# Patient Record
Sex: FEMALE | Race: WHITE | NOT HISPANIC OR LATINO | URBAN - METROPOLITAN AREA
[De-identification: names, ages, dates, MRNs, and addresses within clinical notes are randomized per-mention and may not be internally consistent; named-entity substitution may affect disease eponyms.]

---

## 2017-08-10 ENCOUNTER — HOSPITAL ENCOUNTER (OUTPATIENT)
Dept: PHYSICAL THERAPY | Facility: HOSPITAL | Age: 47
Setting detail: RECURRING SERIES
Discharge: HOME | End: 2017-11-30
Attending: ORTHOPAEDIC SURGERY | Admitting: ORTHOPAEDIC SURGERY

## 2017-12-20 ENCOUNTER — APPOINTMENT (OUTPATIENT)
Dept: WOMENS IMAGING | Facility: HOSPITAL | Age: 47
End: 2017-12-20

## 2017-12-20 PROCEDURE — 77067 SCR MAMMO BI INCL CAD: CPT | Performed by: RADIOLOGY

## 2017-12-20 PROCEDURE — 77063 BREAST TOMOSYNTHESIS BI: CPT | Performed by: RADIOLOGY

## 2025-01-12 ENCOUNTER — APPOINTMENT (OUTPATIENT)
Dept: GENERAL RADIOLOGY | Facility: HOSPITAL | Age: 55
End: 2025-01-12
Payer: COMMERCIAL

## 2025-01-12 ENCOUNTER — HOSPITAL ENCOUNTER (OUTPATIENT)
Facility: HOSPITAL | Age: 55
Setting detail: OBSERVATION
Discharge: HOME | End: 2025-01-13
Attending: EMERGENCY MEDICINE | Admitting: EMERGENCY MEDICINE
Payer: COMMERCIAL

## 2025-01-12 DIAGNOSIS — R07.9 CHEST PAIN, UNSPECIFIED TYPE: Primary | ICD-10-CM

## 2025-01-12 DIAGNOSIS — R11.0 NAUSEA: ICD-10-CM

## 2025-01-12 DIAGNOSIS — R42 DIZZINESS AND GIDDINESS: ICD-10-CM

## 2025-01-12 DIAGNOSIS — R42 DIZZINESS: ICD-10-CM

## 2025-01-12 DIAGNOSIS — E83.42 HYPOMAGNESEMIA: ICD-10-CM

## 2025-01-12 LAB
ALBUMIN SERPL-MCNC: 3.8 G/DL (ref 3.5–5.2)
ALBUMIN/GLOB SERPL: 1.1 G/DL
ALP SERPL-CCNC: 95 U/L (ref 39–117)
ALT SERPL W P-5'-P-CCNC: 31 U/L (ref 1–33)
ANION GAP SERPL CALCULATED.3IONS-SCNC: 10.2 MMOL/L (ref 5–15)
AST SERPL-CCNC: 31 U/L (ref 1–32)
BASOPHILS # BLD AUTO: 0.04 10*3/MM3 (ref 0–0.2)
BASOPHILS NFR BLD AUTO: 0.6 % (ref 0–1.5)
BILIRUB SERPL-MCNC: 0.5 MG/DL (ref 0–1.2)
BUN SERPL-MCNC: 11 MG/DL (ref 6–20)
BUN/CREAT SERPL: 14.7 (ref 7–25)
CALCIUM SPEC-SCNC: 8.8 MG/DL (ref 8.6–10.5)
CHLORIDE SERPL-SCNC: 100 MMOL/L (ref 98–107)
CO2 SERPL-SCNC: 25.8 MMOL/L (ref 22–29)
CREAT SERPL-MCNC: 0.75 MG/DL (ref 0.57–1)
DEPRECATED RDW RBC AUTO: 39.2 FL (ref 37–54)
EGFRCR SERPLBLD CKD-EPI 2021: 94.7 ML/MIN/1.73
EOSINOPHIL # BLD AUTO: 0.22 10*3/MM3 (ref 0–0.4)
EOSINOPHIL NFR BLD AUTO: 3.5 % (ref 0.3–6.2)
ERYTHROCYTE [DISTWIDTH] IN BLOOD BY AUTOMATED COUNT: 12.9 % (ref 12.3–15.4)
GEN 5 1HR TROPONIN T REFLEX: 10 NG/L
GLOBULIN UR ELPH-MCNC: 3.5 GM/DL
GLUCOSE SERPL-MCNC: 199 MG/DL (ref 65–99)
HCT VFR BLD AUTO: 42.2 % (ref 34–46.6)
HGB BLD-MCNC: 14.8 G/DL (ref 12–15.9)
IMM GRANULOCYTES # BLD AUTO: 0.02 10*3/MM3 (ref 0–0.05)
IMM GRANULOCYTES NFR BLD AUTO: 0.3 % (ref 0–0.5)
LYMPHOCYTES # BLD AUTO: 1.87 10*3/MM3 (ref 0.7–3.1)
LYMPHOCYTES NFR BLD AUTO: 30 % (ref 19.6–45.3)
MAGNESIUM SERPL-MCNC: 1.3 MG/DL (ref 1.6–2.6)
MCH RBC QN AUTO: 29.4 PG (ref 26.6–33)
MCHC RBC AUTO-ENTMCNC: 35.1 G/DL (ref 31.5–35.7)
MCV RBC AUTO: 83.7 FL (ref 79–97)
MONOCYTES # BLD AUTO: 0.47 10*3/MM3 (ref 0.1–0.9)
MONOCYTES NFR BLD AUTO: 7.5 % (ref 5–12)
NEUTROPHILS NFR BLD AUTO: 3.62 10*3/MM3 (ref 1.7–7)
NEUTROPHILS NFR BLD AUTO: 58.1 % (ref 42.7–76)
NRBC BLD AUTO-RTO: 0 /100 WBC (ref 0–0.2)
PLATELET # BLD AUTO: 172 10*3/MM3 (ref 140–450)
PMV BLD AUTO: 11 FL (ref 6–12)
POTASSIUM SERPL-SCNC: 4 MMOL/L (ref 3.5–5.2)
PROT SERPL-MCNC: 7.3 G/DL (ref 6–8.5)
RBC # BLD AUTO: 5.04 10*6/MM3 (ref 3.77–5.28)
SODIUM SERPL-SCNC: 136 MMOL/L (ref 136–145)
TROPONIN T NUMERIC DELTA: 0 NG/L
TROPONIN T SERPL HS-MCNC: 10 NG/L
TSH SERPL DL<=0.05 MIU/L-ACNC: 2.14 UIU/ML (ref 0.27–4.2)
WBC NRBC COR # BLD AUTO: 6.24 10*3/MM3 (ref 3.4–10.8)

## 2025-01-12 PROCEDURE — 25010000002 MAGNESIUM SULFATE 2 GM/50ML SOLUTION: Performed by: EMERGENCY MEDICINE

## 2025-01-12 PROCEDURE — 85025 COMPLETE CBC W/AUTO DIFF WBC: CPT

## 2025-01-12 PROCEDURE — 83735 ASSAY OF MAGNESIUM: CPT

## 2025-01-12 PROCEDURE — G0378 HOSPITAL OBSERVATION PER HR: HCPCS

## 2025-01-12 PROCEDURE — 80053 COMPREHEN METABOLIC PANEL: CPT

## 2025-01-12 PROCEDURE — 63710000001 ONDANSETRON ODT 4 MG TABLET DISPERSIBLE

## 2025-01-12 PROCEDURE — 84443 ASSAY THYROID STIM HORMONE: CPT

## 2025-01-12 PROCEDURE — 96365 THER/PROPH/DIAG IV INF INIT: CPT

## 2025-01-12 PROCEDURE — 93005 ELECTROCARDIOGRAM TRACING: CPT | Performed by: EMERGENCY MEDICINE

## 2025-01-12 PROCEDURE — 84484 ASSAY OF TROPONIN QUANT: CPT

## 2025-01-12 PROCEDURE — 71045 X-RAY EXAM CHEST 1 VIEW: CPT

## 2025-01-12 PROCEDURE — 36415 COLL VENOUS BLD VENIPUNCTURE: CPT

## 2025-01-12 PROCEDURE — 96366 THER/PROPH/DIAG IV INF ADDON: CPT

## 2025-01-12 PROCEDURE — 93005 ELECTROCARDIOGRAM TRACING: CPT

## 2025-01-12 PROCEDURE — 99285 EMERGENCY DEPT VISIT HI MDM: CPT

## 2025-01-12 RX ORDER — SODIUM CHLORIDE 0.9 % (FLUSH) 0.9 %
10 SYRINGE (ML) INJECTION AS NEEDED
Status: DISCONTINUED | OUTPATIENT
Start: 2025-01-12 | End: 2025-01-13 | Stop reason: HOSPADM

## 2025-01-12 RX ORDER — ACETAMINOPHEN 160 MG/5ML
650 SOLUTION ORAL EVERY 4 HOURS PRN
Status: DISCONTINUED | OUTPATIENT
Start: 2025-01-12 | End: 2025-01-13 | Stop reason: HOSPADM

## 2025-01-12 RX ORDER — MECLIZINE HYDROCHLORIDE 25 MG/1
25 TABLET ORAL 2 TIMES DAILY PRN
Status: DISCONTINUED | OUTPATIENT
Start: 2025-01-12 | End: 2025-01-13 | Stop reason: HOSPADM

## 2025-01-12 RX ORDER — CETIRIZINE HYDROCHLORIDE 10 MG/1
10 TABLET ORAL DAILY
COMMUNITY

## 2025-01-12 RX ORDER — BISACODYL 5 MG/1
5 TABLET, DELAYED RELEASE ORAL DAILY PRN
Status: DISCONTINUED | OUTPATIENT
Start: 2025-01-12 | End: 2025-01-13 | Stop reason: HOSPADM

## 2025-01-12 RX ORDER — ONDANSETRON 4 MG/1
4 TABLET, ORALLY DISINTEGRATING ORAL ONCE
Status: COMPLETED | OUTPATIENT
Start: 2025-01-12 | End: 2025-01-12

## 2025-01-12 RX ORDER — ASPIRIN 81 MG/1
324 TABLET, CHEWABLE ORAL ONCE
Status: COMPLETED | OUTPATIENT
Start: 2025-01-12 | End: 2025-01-12

## 2025-01-12 RX ORDER — AMOXICILLIN 250 MG
2 CAPSULE ORAL 2 TIMES DAILY PRN
Status: DISCONTINUED | OUTPATIENT
Start: 2025-01-12 | End: 2025-01-13 | Stop reason: HOSPADM

## 2025-01-12 RX ORDER — ONDANSETRON 2 MG/ML
4 INJECTION INTRAMUSCULAR; INTRAVENOUS EVERY 6 HOURS PRN
Status: DISCONTINUED | OUTPATIENT
Start: 2025-01-12 | End: 2025-01-13 | Stop reason: HOSPADM

## 2025-01-12 RX ORDER — ONDANSETRON 4 MG/1
4 TABLET, ORALLY DISINTEGRATING ORAL EVERY 6 HOURS PRN
Status: DISCONTINUED | OUTPATIENT
Start: 2025-01-12 | End: 2025-01-13 | Stop reason: HOSPADM

## 2025-01-12 RX ORDER — ACETAMINOPHEN 650 MG/1
650 SUPPOSITORY RECTAL EVERY 4 HOURS PRN
Status: DISCONTINUED | OUTPATIENT
Start: 2025-01-12 | End: 2025-01-13 | Stop reason: HOSPADM

## 2025-01-12 RX ORDER — IBUPROFEN 800 MG/1
800 TABLET, FILM COATED ORAL EVERY 8 HOURS PRN
COMMUNITY

## 2025-01-12 RX ORDER — ENOXAPARIN SODIUM 100 MG/ML
40 INJECTION SUBCUTANEOUS DAILY
Status: DISCONTINUED | OUTPATIENT
Start: 2025-01-13 | End: 2025-01-13 | Stop reason: HOSPADM

## 2025-01-12 RX ORDER — POLYETHYLENE GLYCOL 3350 17 G/17G
17 POWDER, FOR SOLUTION ORAL DAILY PRN
Status: DISCONTINUED | OUTPATIENT
Start: 2025-01-12 | End: 2025-01-13 | Stop reason: HOSPADM

## 2025-01-12 RX ORDER — SODIUM CHLORIDE 9 MG/ML
40 INJECTION, SOLUTION INTRAVENOUS AS NEEDED
Status: DISCONTINUED | OUTPATIENT
Start: 2025-01-12 | End: 2025-01-13 | Stop reason: HOSPADM

## 2025-01-12 RX ORDER — BISACODYL 10 MG
10 SUPPOSITORY, RECTAL RECTAL DAILY PRN
Status: DISCONTINUED | OUTPATIENT
Start: 2025-01-12 | End: 2025-01-13 | Stop reason: HOSPADM

## 2025-01-12 RX ORDER — NITROGLYCERIN 0.4 MG/1
0.4 TABLET SUBLINGUAL
Status: DISCONTINUED | OUTPATIENT
Start: 2025-01-12 | End: 2025-01-13 | Stop reason: HOSPADM

## 2025-01-12 RX ORDER — SODIUM CHLORIDE 0.9 % (FLUSH) 0.9 %
10 SYRINGE (ML) INJECTION EVERY 12 HOURS SCHEDULED
Status: DISCONTINUED | OUTPATIENT
Start: 2025-01-12 | End: 2025-01-13 | Stop reason: HOSPADM

## 2025-01-12 RX ORDER — ACETAMINOPHEN 325 MG/1
650 TABLET ORAL EVERY 4 HOURS PRN
Status: DISCONTINUED | OUTPATIENT
Start: 2025-01-12 | End: 2025-01-13 | Stop reason: HOSPADM

## 2025-01-12 RX ORDER — MECLIZINE HYDROCHLORIDE 25 MG/1
25 TABLET ORAL ONCE
Status: COMPLETED | OUTPATIENT
Start: 2025-01-12 | End: 2025-01-12

## 2025-01-12 RX ORDER — TRAMADOL HYDROCHLORIDE 50 MG/1
50 TABLET ORAL 3 TIMES DAILY PRN
COMMUNITY

## 2025-01-12 RX ORDER — MAGNESIUM SULFATE HEPTAHYDRATE 40 MG/ML
2 INJECTION, SOLUTION INTRAVENOUS
Status: COMPLETED | OUTPATIENT
Start: 2025-01-12 | End: 2025-01-13

## 2025-01-12 RX ADMIN — ASPIRIN 81 MG CHEWABLE TABLET 324 MG: 81 TABLET CHEWABLE at 14:47

## 2025-01-12 RX ADMIN — ONDANSETRON 4 MG: 4 TABLET, ORALLY DISINTEGRATING ORAL at 18:35

## 2025-01-12 RX ADMIN — MAGNESIUM SULFATE IN WATER FOR 2 G: 40 INJECTION INTRAVENOUS at 16:36

## 2025-01-12 RX ADMIN — MAGNESIUM SULFATE IN WATER FOR 2 G: 40 INJECTION INTRAVENOUS at 19:49

## 2025-01-12 RX ADMIN — Medication 10 ML: at 22:02

## 2025-01-12 RX ADMIN — MAGNESIUM SULFATE IN WATER FOR 2 G: 40 INJECTION INTRAVENOUS at 22:20

## 2025-01-12 RX ADMIN — ONDANSETRON 4 MG: 4 TABLET, ORALLY DISINTEGRATING ORAL at 14:47

## 2025-01-12 RX ADMIN — MECLIZINE HYDROCHLORIDE 25 MG: 25 TABLET ORAL at 18:35

## 2025-01-12 NOTE — ED NOTES
Provider Krystle at bedside to speak with patient regarding labs/diagnostic and plan of care, pending discharge. Family at bedside. No distress. Vitals stable. No needs.Sinus rhythm on monitor. Pt. Denies chest pain.

## 2025-01-12 NOTE — ED NOTES
Pt. Return to bed after discharge stating while leaving she went to the restroom and felt weak/dizzy and near syncopal. C/o nausea and dizziness at this time. Fully dressed with family at bedside. Provider notified and pt. Given the option to stay overnight for observation.

## 2025-01-12 NOTE — DISCHARGE INSTRUCTIONS
Increase fluid intake.  Continue home medications.    Follow-up closely with PCP for repeat labs.    Follow-up with cardiology.    Return to the ED for any new or worsening symptoms.

## 2025-01-13 ENCOUNTER — APPOINTMENT (OUTPATIENT)
Dept: NUCLEAR MEDICINE | Facility: HOSPITAL | Age: 55
End: 2025-01-13
Payer: COMMERCIAL

## 2025-01-13 ENCOUNTER — APPOINTMENT (OUTPATIENT)
Dept: CARDIOLOGY | Facility: HOSPITAL | Age: 55
End: 2025-01-13
Payer: COMMERCIAL

## 2025-01-13 ENCOUNTER — APPOINTMENT (OUTPATIENT)
Dept: RESPIRATORY THERAPY | Facility: HOSPITAL | Age: 55
End: 2025-01-13
Payer: COMMERCIAL

## 2025-01-13 VITALS
SYSTOLIC BLOOD PRESSURE: 152 MMHG | BODY MASS INDEX: 36.88 KG/M2 | WEIGHT: 235 LBS | RESPIRATION RATE: 15 BRPM | HEIGHT: 67 IN | HEART RATE: 80 BPM | DIASTOLIC BLOOD PRESSURE: 79 MMHG | TEMPERATURE: 98.4 F | OXYGEN SATURATION: 96 %

## 2025-01-13 LAB
ANION GAP SERPL CALCULATED.3IONS-SCNC: 11.2 MMOL/L (ref 5–15)
AV MEAN PRESS GRAD SYS DOP V1V2: 6 MMHG
AV VMAX SYS DOP: 156 CM/SEC
BH CV ECHO MEAS - AO MAX PG: 9.7 MMHG
BH CV ECHO MEAS - AO V2 VTI: 30.3 CM
BH CV ECHO MEAS - AVA(I,D): 2.6 CM2
BH CV ECHO MEAS - EDV(CUBED): 74.1 ML
BH CV ECHO MEAS - EDV(MOD-SP4): 125 ML
BH CV ECHO MEAS - EF(MOD-SP4): 67.6 %
BH CV ECHO MEAS - ESV(CUBED): 24.4 ML
BH CV ECHO MEAS - ESV(MOD-SP4): 40.5 ML
BH CV ECHO MEAS - FS: 31 %
BH CV ECHO MEAS - IVS/LVPW: 1 CM
BH CV ECHO MEAS - IVSD: 1.2 CM
BH CV ECHO MEAS - LA DIMENSION: 3.6 CM
BH CV ECHO MEAS - LAT PEAK E' VEL: 8.1 CM/SEC
BH CV ECHO MEAS - LV DIASTOLIC VOL/BSA (35-75): 57.7 CM2
BH CV ECHO MEAS - LV MASS(C)D: 178.2 GRAMS
BH CV ECHO MEAS - LV MAX PG: 6.3 MMHG
BH CV ECHO MEAS - LV MEAN PG: 3 MMHG
BH CV ECHO MEAS - LV SYSTOLIC VOL/BSA (12-30): 18.7 CM2
BH CV ECHO MEAS - LV V1 MAX: 125 CM/SEC
BH CV ECHO MEAS - LV V1 VTI: 25.1 CM
BH CV ECHO MEAS - LVIDD: 4.2 CM
BH CV ECHO MEAS - LVIDS: 2.9 CM
BH CV ECHO MEAS - LVOT AREA: 3.1 CM2
BH CV ECHO MEAS - LVOT DIAM: 2 CM
BH CV ECHO MEAS - LVPWD: 1.2 CM
BH CV ECHO MEAS - MED PEAK E' VEL: 5.4 CM/SEC
BH CV ECHO MEAS - MV A MAX VEL: 106 CM/SEC
BH CV ECHO MEAS - MV DEC SLOPE: 395 CM/SEC2
BH CV ECHO MEAS - MV DEC TIME: 0.22 SEC
BH CV ECHO MEAS - MV E MAX VEL: 79.1 CM/SEC
BH CV ECHO MEAS - MV E/A: 0.75
BH CV ECHO MEAS - MV MAX PG: 5.6 MMHG
BH CV ECHO MEAS - MV MEAN PG: 3 MMHG
BH CV ECHO MEAS - MV P1/2T: 72.4 MSEC
BH CV ECHO MEAS - MV V2 VTI: 27.7 CM
BH CV ECHO MEAS - MVA(P1/2T): 3 CM2
BH CV ECHO MEAS - MVA(VTI): 2.8 CM2
BH CV ECHO MEAS - PA ACC TIME: 0.12 SEC
BH CV ECHO MEAS - PA V2 MAX: 107 CM/SEC
BH CV ECHO MEAS - RAP SYSTOLE: 3 MMHG
BH CV ECHO MEAS - RV MAX PG: 2.45 MMHG
BH CV ECHO MEAS - RV V1 MAX: 78.2 CM/SEC
BH CV ECHO MEAS - RV V1 VTI: 16.7 CM
BH CV ECHO MEAS - RVDD: 3 CM
BH CV ECHO MEAS - RVSP: 10.8 MMHG
BH CV ECHO MEAS - SV(LVOT): 78.9 ML
BH CV ECHO MEAS - SV(MOD-SP4): 84.5 ML
BH CV ECHO MEAS - SVI(LVOT): 36.4 ML/M2
BH CV ECHO MEAS - SVI(MOD-SP4): 39 ML/M2
BH CV ECHO MEAS - TAPSE (>1.6): 2.14 CM
BH CV ECHO MEAS - TR MAX PG: 7.8 MMHG
BH CV ECHO MEAS - TR MAX VEL: 140 CM/SEC
BH CV ECHO MEASUREMENTS AVERAGE E/E' RATIO: 11.72
BH CV REST NUCLEAR ISOTOPE DOSE: 11 MCI
BH CV STRESS BP STAGE 1: NORMAL
BH CV STRESS BP STAGE 2: NORMAL
BH CV STRESS COMMENTS STAGE 1: NORMAL
BH CV STRESS COMMENTS STAGE 2: NORMAL
BH CV STRESS DOSE REGADENOSON STAGE 1: 0.4
BH CV STRESS DURATION MIN STAGE 1: 0
BH CV STRESS DURATION MIN STAGE 2: 4
BH CV STRESS DURATION SEC STAGE 1: 10
BH CV STRESS DURATION SEC STAGE 2: 0
BH CV STRESS HR STAGE 1: 96
BH CV STRESS HR STAGE 2: 92
BH CV STRESS NUCLEAR ISOTOPE DOSE: 33 MCI
BH CV STRESS PROTOCOL 1: NORMAL
BH CV STRESS RECOVERY BP: NORMAL MMHG
BH CV STRESS RECOVERY HR: 92 BPM
BH CV STRESS STAGE 1: 1
BH CV STRESS STAGE 2: 2
BH CV XLRA - TDI S': 12.3 CM/SEC
BUN SERPL-MCNC: 9 MG/DL (ref 6–20)
BUN/CREAT SERPL: 13.8 (ref 7–25)
CALCIUM SPEC-SCNC: 8.8 MG/DL (ref 8.6–10.5)
CHLORIDE SERPL-SCNC: 101 MMOL/L (ref 98–107)
CHOLEST SERPL-MCNC: 131 MG/DL (ref 0–200)
CO2 SERPL-SCNC: 24.8 MMOL/L (ref 22–29)
CREAT SERPL-MCNC: 0.65 MG/DL (ref 0.57–1)
DEPRECATED RDW RBC AUTO: 39.3 FL (ref 37–54)
EGFRCR SERPLBLD CKD-EPI 2021: 104.8 ML/MIN/1.73
EOSINOPHIL # BLD MANUAL: 0.17 10*3/MM3 (ref 0–0.4)
EOSINOPHIL NFR BLD MANUAL: 2 % (ref 0.3–6.2)
ERYTHROCYTE [DISTWIDTH] IN BLOOD BY AUTOMATED COUNT: 12.9 % (ref 12.3–15.4)
GLUCOSE BLDC GLUCOMTR-MCNC: 169 MG/DL (ref 70–105)
GLUCOSE BLDC GLUCOMTR-MCNC: 181 MG/DL (ref 70–105)
GLUCOSE SERPL-MCNC: 195 MG/DL (ref 65–99)
HCT VFR BLD AUTO: 37.8 % (ref 34–46.6)
HDLC SERPL-MCNC: 45 MG/DL (ref 40–60)
HGB BLD-MCNC: 13.1 G/DL (ref 12–15.9)
LARGE PLATELETS: NORMAL
LDLC SERPL CALC-MCNC: 73 MG/DL (ref 0–100)
LDLC/HDLC SERPL: 1.65 {RATIO}
LEFT ATRIUM VOLUME INDEX: 30.3 ML/M2
LV EF BIPLANE MOD: 67 %
LYMPHOCYTES # BLD MANUAL: 3.07 10*3/MM3 (ref 0.7–3.1)
MAGNESIUM SERPL-MCNC: 3 MG/DL (ref 1.6–2.6)
MAXIMAL PREDICTED HEART RATE: 166 BPM
MCH RBC QN AUTO: 28.9 PG (ref 26.6–33)
MCHC RBC AUTO-ENTMCNC: 34.7 G/DL (ref 31.5–35.7)
MCV RBC AUTO: 83.4 FL (ref 79–97)
NEUTROPHILS # BLD AUTO: 5.07 10*3/MM3 (ref 1.7–7)
NEUTROPHILS NFR BLD MANUAL: 60 % (ref 42.7–76)
NEUTS BAND NFR BLD MANUAL: 1 % (ref 0–5)
PERCENT MAX PREDICTED HR: 57.83 %
PLATELET # BLD AUTO: 169 10*3/MM3 (ref 140–450)
PMV BLD AUTO: 11.6 FL (ref 6–12)
POTASSIUM SERPL-SCNC: 3.8 MMOL/L (ref 3.5–5.2)
QT INTERVAL: 377 MS
QTC INTERVAL: 444 MS
RBC # BLD AUTO: 4.53 10*6/MM3 (ref 3.77–5.28)
RBC MORPH BLD: NORMAL
SINUS: 2.9 CM
SODIUM SERPL-SCNC: 137 MMOL/L (ref 136–145)
SPECT HRT GATED+EF W RNC IV: 72 %
STJ: 2.1 CM
STRESS BASELINE BP: NORMAL MMHG
STRESS BASELINE HR: 86 BPM
STRESS PERCENT HR: 68 %
STRESS POST PEAK BP: NORMAL MMHG
STRESS POST PEAK HR: 96 BPM
STRESS TARGET HR: 141 BPM
TRIGL SERPL-MCNC: 59 MG/DL (ref 0–150)
VARIANT LYMPHS NFR BLD MANUAL: 2 % (ref 0–5)
VARIANT LYMPHS NFR BLD MANUAL: 35 % (ref 19.6–45.3)
VLDLC SERPL-MCNC: 13 MG/DL (ref 5–40)
WBC MORPH BLD: NORMAL
WBC NRBC COR # BLD AUTO: 8.31 10*3/MM3 (ref 3.4–10.8)

## 2025-01-13 PROCEDURE — 93018 CV STRESS TEST I&R ONLY: CPT | Performed by: INTERNAL MEDICINE

## 2025-01-13 PROCEDURE — 25010000002 SULFUR HEXAFLUORIDE MICROSPH 60.7-25 MG RECONSTITUTED SUSPENSION: Performed by: EMERGENCY MEDICINE

## 2025-01-13 PROCEDURE — 83735 ASSAY OF MAGNESIUM: CPT | Performed by: EMERGENCY MEDICINE

## 2025-01-13 PROCEDURE — 93306 TTE W/DOPPLER COMPLETE: CPT | Performed by: INTERNAL MEDICINE

## 2025-01-13 PROCEDURE — 78452 HT MUSCLE IMAGE SPECT MULT: CPT

## 2025-01-13 PROCEDURE — A9502 TC99M TETROFOSMIN: HCPCS | Performed by: EMERGENCY MEDICINE

## 2025-01-13 PROCEDURE — 78452 HT MUSCLE IMAGE SPECT MULT: CPT | Performed by: INTERNAL MEDICINE

## 2025-01-13 PROCEDURE — 93017 CV STRESS TEST TRACING ONLY: CPT

## 2025-01-13 PROCEDURE — G0378 HOSPITAL OBSERVATION PER HR: HCPCS

## 2025-01-13 PROCEDURE — 80048 BASIC METABOLIC PNL TOTAL CA: CPT

## 2025-01-13 PROCEDURE — 85007 BL SMEAR W/DIFF WBC COUNT: CPT

## 2025-01-13 PROCEDURE — 80061 LIPID PANEL: CPT | Performed by: PHYSICIAN ASSISTANT

## 2025-01-13 PROCEDURE — 82948 REAGENT STRIP/BLOOD GLUCOSE: CPT | Performed by: PHYSICIAN ASSISTANT

## 2025-01-13 PROCEDURE — 85027 COMPLETE CBC AUTOMATED: CPT

## 2025-01-13 PROCEDURE — 25010000002 REGADENOSON 0.4 MG/5ML SOLUTION: Performed by: EMERGENCY MEDICINE

## 2025-01-13 PROCEDURE — 93306 TTE W/DOPPLER COMPLETE: CPT

## 2025-01-13 PROCEDURE — 97161 PT EVAL LOW COMPLEX 20 MIN: CPT

## 2025-01-13 PROCEDURE — 34310000005 TECHNETIUM TETROFOSMIN KIT: Performed by: EMERGENCY MEDICINE

## 2025-01-13 PROCEDURE — 99204 OFFICE O/P NEW MOD 45 MIN: CPT | Performed by: INTERNAL MEDICINE

## 2025-01-13 PROCEDURE — 63710000001 INSULIN LISPRO (HUMAN) PER 5 UNITS: Performed by: PHYSICIAN ASSISTANT

## 2025-01-13 RX ORDER — NICOTINE POLACRILEX 4 MG
15 LOZENGE BUCCAL
Status: DISCONTINUED | OUTPATIENT
Start: 2025-01-13 | End: 2025-01-13 | Stop reason: HOSPADM

## 2025-01-13 RX ORDER — REGADENOSON 0.08 MG/ML
0.4 INJECTION, SOLUTION INTRAVENOUS
Status: COMPLETED | OUTPATIENT
Start: 2025-01-13 | End: 2025-01-13

## 2025-01-13 RX ORDER — INSULIN LISPRO 100 [IU]/ML
2-9 INJECTION, SOLUTION INTRAVENOUS; SUBCUTANEOUS
Status: DISCONTINUED | OUTPATIENT
Start: 2025-01-13 | End: 2025-01-13 | Stop reason: HOSPADM

## 2025-01-13 RX ORDER — METOPROLOL SUCCINATE 25 MG/1
12.5 TABLET, EXTENDED RELEASE ORAL EVERY 12 HOURS SCHEDULED
Qty: 30 TABLET | Refills: 0 | Status: SHIPPED | OUTPATIENT
Start: 2025-01-13 | End: 2025-02-12

## 2025-01-13 RX ORDER — DEXTROSE MONOHYDRATE 25 G/50ML
25 INJECTION, SOLUTION INTRAVENOUS
Status: DISCONTINUED | OUTPATIENT
Start: 2025-01-13 | End: 2025-01-13 | Stop reason: HOSPADM

## 2025-01-13 RX ORDER — ASPIRIN 81 MG/1
81 TABLET, CHEWABLE ORAL DAILY
Status: DISCONTINUED | OUTPATIENT
Start: 2025-01-13 | End: 2025-01-13 | Stop reason: HOSPADM

## 2025-01-13 RX ORDER — TRAMADOL HYDROCHLORIDE 50 MG/1
50 TABLET ORAL 3 TIMES DAILY PRN
Status: DISCONTINUED | OUTPATIENT
Start: 2025-01-13 | End: 2025-01-13 | Stop reason: HOSPADM

## 2025-01-13 RX ORDER — METOPROLOL SUCCINATE 25 MG/1
12.5 TABLET, EXTENDED RELEASE ORAL EVERY 12 HOURS SCHEDULED
Status: DISCONTINUED | OUTPATIENT
Start: 2025-01-13 | End: 2025-01-13 | Stop reason: HOSPADM

## 2025-01-13 RX ORDER — IBUPROFEN 600 MG/1
1 TABLET ORAL
Status: DISCONTINUED | OUTPATIENT
Start: 2025-01-13 | End: 2025-01-13 | Stop reason: HOSPADM

## 2025-01-13 RX ORDER — CETIRIZINE HYDROCHLORIDE 10 MG/1
10 TABLET ORAL DAILY
Status: DISCONTINUED | OUTPATIENT
Start: 2025-01-13 | End: 2025-01-13 | Stop reason: HOSPADM

## 2025-01-13 RX ADMIN — INSULIN LISPRO 2 UNITS: 100 INJECTION, SOLUTION INTRAVENOUS; SUBCUTANEOUS at 10:00

## 2025-01-13 RX ADMIN — TETROFOSMIN 1 DOSE: 1.38 INJECTION, POWDER, LYOPHILIZED, FOR SOLUTION INTRAVENOUS at 13:24

## 2025-01-13 RX ADMIN — ASPIRIN 81 MG CHEWABLE TABLET 81 MG: 81 TABLET CHEWABLE at 10:00

## 2025-01-13 RX ADMIN — CETIRIZINE HYDROCHLORIDE 10 MG: 10 TABLET, FILM COATED ORAL at 10:00

## 2025-01-13 RX ADMIN — TETROFOSMIN 1 DOSE: 1.38 INJECTION, POWDER, LYOPHILIZED, FOR SOLUTION INTRAVENOUS at 11:35

## 2025-01-13 RX ADMIN — SULFUR HEXAFLUORIDE 2 ML: KIT at 16:23

## 2025-01-13 RX ADMIN — REGADENOSON 0.4 MG: 0.08 INJECTION, SOLUTION INTRAVENOUS at 13:24

## 2025-01-13 NOTE — THERAPY EVALUATION
Patient Name: Dino Melgoza  : 1970    MRN: [id number]                              Today's Date: 2025       Admit Date: 2025    Visit Dx:     ICD-10-CM ICD-9-CM   1. Chest pain, unspecified type  R07.9 786.50   2. Dizziness  R42 780.4   3. Nausea  R11.0 787.02   4. Hypomagnesemia  E83.42 275.2   5. Dizziness and giddiness  R42 780.4     Patient Active Problem List   Diagnosis    Dizziness    Chest pain     Past Medical History:   Diagnosis Date    Diabetes mellitus      Past Surgical History:   Procedure Laterality Date    BILATERAL BREAST REDUCTION Bilateral       General Information       Row Name 25 1236          Physical Therapy Time and Intention    Document Type evaluation  -AM     Mode of Treatment physical therapy  -AM       Row Name 25 1236          General Information    Patient Profile Reviewed yes  -AM     Prior Level of Function independent:;all household mobility;community mobility;gait;transfer;bed mobility;using stairs  -AM     Existing Precautions/Restrictions no known precautions/restrictions  -AM     Barriers to Rehab none identified  -AM       Row Name 25 1236          Living Environment    People in Home child(manish), adult  -AM       Row Name 25 1236          Home Main Entrance    Number of Stairs, Main Entrance five  -AM     Stair Railings, Main Entrance railings safe and in good condition  -AM       Row Name 25 1236          Stairs Within Home, Primary    Number of Stairs, Within Home, Primary none  -AM       Row Name 25 1236          Cognition    Orientation Status (Cognition) oriented x 4  -AM       Row Name 25 1236          Safety Issues/Impairments Affecting Functional Mobility    Impairments Affecting Function (Mobility) pain  -AM     Comment, Safety Issues/Impairments (Mobility) gait belt utilized  -AM               User Key  (r) = Recorded By, (t) = Taken By, (c) = Cosigned By      Initials Name Provider Type    AM Millay,  Robert, PT Physical Therapist                   Mobility       Row Name 01/13/25 1237          Bed Mobility    Bed Mobility bed mobility (all) activities  -AM     All Activities, [place] (Bed Mobility) independent  -AM       Row Name 01/13/25 1237          Sit-Stand Transfer    Sit-Stand [place] (Transfers) independent  -AM       Row Name 01/13/25 1237          Gait/Stairs (Locomotion)    [place] Level (Gait) independent  -AM     Distance in Feet (Gait) 300  -AM               User Key  (r) = Recorded By, (t) = Taken By, (c) = Cosigned By      Initials Name Provider Type    AM Robert Baeza, PT Physical Therapist                   Obj/Interventions       Row Name 01/13/25 1237          Range of Motion Comprehensive    General Range of Motion no range of motion deficits identified  -AM       Row Name 01/13/25 1237          Strength Comprehensive (MMT)    General Manual Muscle Testing (MMT) Assessment no strength deficits identified  -AM       Row Name 01/13/25 1237          Balance    Balance Assessment sitting static balance;sitting dynamic balance;standing static balance;standing dynamic balance  -AM     Static Sitting Balance independent  -AM     Dynamic Sitting Balance independent  -AM     Position, Sitting Balance unsupported  -AM     Static Standing Balance independent  -AM     Dynamic Standing Balance independent  -AM       Row Name 01/13/25 1237          Sensory Assessment (Somatosensory)    Sensory Assessment (Somatosensory) sensation intact  -AM               User Key  (r) = Recorded By, (t) = Taken By, (c) = Cosigned By      Initials Name Provider Type    AM Robert Baeza, PT Physical Therapist                   Goals/Plan    No documentation.                  Clinical Impression       Row Name 01/13/25 1238          Pain    Pretreatment Pain Rating 0/10 - no pain  -AM     Posttreatment Pain Rating 3/10  -AM     Pain Location other (see comments)  headache  -AM     Pain Management  Interventions exercise or physical activity utilized  -AM     Response to Pain Interventions other (see comments)  headache when laying down at end of session  -AM       Row Name 01/13/25 1238          Plan of Care Review    Plan of Care Reviewed With patient  -AM     Outcome Evaluation Pt is a 53 y/o female that presents with dizziness and chest pain.  Chest X-ray: (-) acute.  Pt reports she lives with her daughter (when she is not away at college).  Pt lives in a 2 story home with 5 steps to enter with railings.  Pt's bedroom/bathroom is located on main floor.   Pt was independent with all functional mobility tasks and did not use an assistive device prior to hospitalization.  Pt on room air and telemetry this date.  Pt was independent with bed mobility and transfers and ambulated 300' with independence.  Pt declined need to attempt stair navigation.  Pt with no c/o dizziness throughout session.  Pt currently has no skilled PT needs, will d/c from inpt PT services.  -AM       Row Name 01/13/25 1238          Therapy Assessment/Plan (PT)    Patient/Family Therapy Goals Statement (PT) To go home  -AM     Criteria for Skilled Interventions Met (PT) no problems identified which require skilled intervention  -AM     Therapy Frequency (PT) evaluation only  -AM       Row Name 01/13/25 1238          Vital Signs    O2 Delivery Pre Treatment room air  -AM     O2 Delivery Intra Treatment room air  -AM     O2 Delivery Post Treatment room air  -AM     Pre Patient Position Supine  -AM     Intra Patient Position Standing  -AM     Post Patient Position Supine  -AM       Row Name 01/13/25 1238          Positioning and Restraints    Pre-Treatment Position in bed  -AM     Post Treatment Position bed  -AM     In Bed notified nsg;supine;call light within reach  -AM               User Key  (r) = Recorded By, (t) = Taken By, (c) = Cosigned By      Initials Name Provider Type    AM Robert Baeza, PT Physical Therapist                    Outcome Measures       Row Name 01/13/25 1246          How much help from another person do you currently need...    Turning from your back to your side while in flat bed without using bedrails? 4  -AM     Moving from lying on back to sitting on the side of a flat bed without bedrails? 4  -AM     Moving to and from a bed to a chair (including a wheelchair)? 4  -AM     Standing up from a chair using your arms (e.g., wheelchair, bedside chair)? 4  -AM     Climbing 3-5 steps with a railing? 3  -AM     To walk in hospital room? 4  -AM     AM-PAC 6 Clicks Score (PT) 23  -AM               User Key  (r) = Recorded By, (t) = Taken By, (c) = Cosigned By      Initials Name Provider Type    AM Robert Baeza PT Physical Therapist                                 Physical Therapy Education       Title: PT OT SLP Therapies (Done)       Topic: Physical Therapy (Done)       Point: Mobility training (Done)       Learning Progress Summary            Patient Acceptance, E,TB, VU by AM at 1/13/2025 1246                      Point: Body mechanics (Done)       Learning Progress Summary            Patient Acceptance, E,TB, VU by AM at 1/13/2025 1246                      Point: Precautions (Done)       Learning Progress Summary            Patient Acceptance, E,TB, VU by AM at 1/13/2025 1246                                      User Key       Initials Effective Dates Name Provider Type Discipline    AM 05/10/21 -  Robert Baeza PT Physical Therapist PT                  PT Recommendation and Plan     Outcome Evaluation: Pt is a 55 y/o female that presents with dizziness and chest pain.  Chest X-ray: (-) acute.  Pt reports she lives with her daughter (when she is not away at college).  Pt lives in a 2 story home with 5 steps to enter with railings.  Pt's bedroom/bathroom is located on main floor.   Pt was independent with all functional mobility tasks and did not use an assistive device prior to hospitalization.  Pt on room air and  telemetry this date.  Pt was independent with bed mobility and transfers and ambulated 300' with independence.  Pt declined need to attempt stair navigation.  Pt with no c/o dizziness throughout session.  Pt currently has no skilled PT needs, will d/c from inpt PT services.     Time Calculation:         PT Charges       Row Name 01/13/25 1247             Time Calculation    Start Time 0950  -AM      Stop Time 1004  -AM      Time Calculation (min) 14 min  -AM      PT Received On 01/13/25  -AM                User Key  (r) = Recorded By, (t) = Taken By, (c) = Cosigned By      Initials Name Provider Type    AM Robert Baeza, PT Physical Therapist                  Therapy Charges for Today       Code Description Service Date Service Provider Modifiers Qty    82588329515 HC PT EVAL LOW COMPLEXITY 3 1/13/2025 Robert Baeza, PT GP 1            PT G-Codes  AM-PAC 6 Clicks Score (PT): 23  PT Discharge Summary  Anticipated Discharge Disposition (PT): home    Robert Baeza PT  1/13/2025

## 2025-01-13 NOTE — DISCHARGE SUMMARY
[facility]    Jeff HennessyHARRIS    CHIEF COMPLAINT:     Dizziness and chest pain    HISTORY OF PRESENT ILLNESS:    Obtained from ED provider HPI on 1/12/2025:  Patient is a 54-year-old female with PMH of DM2 presenting to the ED for dizziness and chest pain.  Patient states she was laying in her bed earlier today and started noticing dizziness and nausea.  She was able to stand up and started feeling chest pain on the left side of her chest spreading slightly.  She rates the initial dizziness a 10 out of 10, rates it now a 3 out of 10.  She reports pain in her chest as a sharp intermittent 4 out of 10.  She denies any vomiting, fever, chills, abdominal pain, headache, history of hypertension, confusion, or changes in vision.    01/13/25:  Patient confirms the HPI noted above reporting profound dizziness which began on the morning of presentation while at rest.  She notes she had some nausea without vomiting and subsequently developed a stabbing left-sided chest pain which lasted for several hours before resolving spontaneously however this did recur following her admission with position change.  She denies any dyspnea, cough, fever, palpitations or changes in bowel or bladder habits.  No change in hearing or vision was noted.  Patient does not smoke and drinks alcohol occasionally.  Family history is notable for maternal grandmother who passed away of an MI in her 60s            Past Medical History:   Diagnosis Date    Diabetes mellitus      Past Surgical History:   Procedure Laterality Date    BILATERAL BREAST REDUCTION Bilateral      History reviewed. No pertinent family history.  Social History     Tobacco Use    Smoking status: Never    Smokeless tobacco: Never   Vaping Use    Vaping status: Never Used   Substance Use Topics    Alcohol use: Never    Drug use: Never     Medications Prior to Admission   Medication Sig Dispense Refill Last Dose/Taking    cetirizine (zyrTEC) 10 MG  tablet Take 1 tablet by mouth Daily.   1/11/2025 Morning    ibuprofen (ADVIL,MOTRIN) 800 MG tablet Take 1 tablet by mouth Every 8 (Eight) Hours As Needed for Mild Pain.   1/11/2025 Evening    metFORMIN (GLUCOPHAGE) 1000 MG tablet Take 1 tablet by mouth 2 (Two) Times a Day With Meals.   1/11/2025 Evening    traMADol (ULTRAM) 50 MG tablet Take 1 tablet by mouth 3 (Three) Times a Day As Needed for Moderate Pain.   1/11/2025 Evening     Allergies:  Sulfa antibiotics and Penicillins    Immunization History   Administered Date(s) Administered    COVID-19 (PFIZER) Purple Cap Monovalent 01/05/2021, 01/27/2021           REVIEW OF SYSTEMS:    Review of Systems   Constitutional: Negative.   HENT: Negative.     Eyes: Negative.    Cardiovascular:  Positive for chest pain.   Respiratory: Negative.     Skin: Negative.    Musculoskeletal: Negative.    Gastrointestinal:  Positive for nausea. Negative for vomiting.   Genitourinary: Negative.    Neurological:  Positive for dizziness.   Psychiatric/Behavioral: Negative.       Vital Signs  Temp:  [98.4 °F (36.9 °C)-98.6 °F (37 °C)] 98.4 °F (36.9 °C)  Heart Rate:  [77-83] 80  Resp:  [11-18] 15  BP: (144-167)/(74-84) 152/79          Physical Exam:  Physical Exam  Vitals reviewed.   Constitutional:       General: She is not in acute distress.     Appearance: Normal appearance. She is not ill-appearing, toxic-appearing or diaphoretic.   HENT:      Head: Normocephalic.      Right Ear: External ear normal.      Left Ear: External ear normal.      Nose: Nose normal.      Mouth/Throat:      Mouth: Mucous membranes are moist.   Eyes:      Extraocular Movements: Extraocular movements intact.   Cardiovascular:      Rate and Rhythm: Normal rate and regular rhythm.      Pulses: Normal pulses.   Pulmonary:      Effort: Pulmonary effort is normal.      Breath sounds: Normal breath sounds.   Abdominal:      General: Bowel sounds are normal.      Palpations: Abdomen is soft.   Musculoskeletal:          General: Normal range of motion.      Cervical back: Normal range of motion.      Right lower leg: No edema.      Left lower leg: No edema.   Skin:     General: Skin is warm and dry.      Capillary Refill: Capillary refill takes less than 2 seconds.   Neurological:      General: No focal deficit present.      Mental Status: She is alert and oriented to person, place, and time.   Psychiatric:         Mood and Affect: Mood normal.         Behavior: Behavior normal.         Thought Content: Thought content normal.         Judgment: Judgment normal.       Emotional Behavior:   Normal   Debilities:  None  Results Review:    I reviewed the patient's new clinical results.  Lab Results (most recent)       Procedure Component Value Units Date/Time    Magnesium [91970]  (Abnormal) Collected: 01/13/25 0102    Specimen: Blood from Arm, Left Updated: 01/13/25 0219     Magnesium 3.0 mg/dL     Basic Metabolic Panel [[id number]]  (Abnormal) Collected: 01/13/25 0102    Specimen: Blood from Arm, Left Updated: 01/13/25 0219     Glucose 195 mg/dL      BUN 9 mg/dL      Creatinine 0.65 mg/dL      Sodium 137 mmol/L      Potassium 3.8 mmol/L      Chloride 101 mmol/L      CO2 24.8 mmol/L      Calcium 8.8 mg/dL      BUN/Creatinine Ratio 13.8     Anion Gap 11.2 mmol/L      eGFR 104.8 mL/min/1.73     Narrative:      GFR Categories in Chronic Kidney Disease (CKD)      GFR Category          GFR (mL/min/1.73)    Interpretation  G1                     90 or greater         Normal or high (1)  G2                      60-89                Mild decrease (1)  G3a                   45-59                Mild to moderate decrease  G3b                   30-44                Moderate to severe decrease  G4                    15-29                Severe decrease  G5                    14 or less           Kidney failure          (1)In the absence of evidence of kidney disease, neither GFR category G1 or G2 fulfill the criteria for CKD.    eGFR  calculation 2021 CKD-EPI creatinine equation, which does not include race as a factor    CBC & Differential [[id number]] Collected: 01/13/25 0102    Specimen: Blood from Arm, Left Updated: 01/13/25 0217    Narrative:      The following orders were created for panel order CBC & Differential.  Procedure                               Abnormality         Status                     ---------                               -----------         ------                     Manual Differential[[id number]]                              Final result               CBC Auto Differential[[id number]]        Normal              Final result                 Please view results for these tests on the individual orders.    Manual Differential [[id number]] Collected: 01/13/25 0102    Specimen: Blood from Arm, Left Updated: 01/13/25 0217     Neutrophil % 60.0 %      Lymphocyte % 35.0 %      Eosinophil % 2.0 %      Bands %  1.0 %      Atypical Lymphocyte % 2.0 %      Neutrophils Absolute 5.07 10*3/mm3      Lymphocytes Absolute 3.07 10*3/mm3      Eosinophils Absolute 0.17 10*3/mm3      RBC Morphology Normal     WBC Morphology Normal     Large Platelets Slight/1+    CBC Auto Differential [[id number]]  (Normal) Collected: 01/13/25 0102    Specimen: Blood from Arm, Left Updated: 01/13/25 0151     WBC 8.31 10*3/mm3      RBC 4.53 10*6/mm3      Hemoglobin 13.1 g/dL      Hematocrit 37.8 %      MCV 83.4 fL      MCH 28.9 pg      MCHC 34.7 g/dL      RDW 12.9 %      RDW-SD 39.3 fl      MPV 11.6 fL      Platelets 169 10*3/mm3     High Sensitivity Troponin T 1Hr [[id number]]  (Normal) Collected: 01/12/25 1628    Specimen: Blood Updated: 01/12/25 1707     HS Troponin T 10 ng/L      Troponin T Numeric Delta 0 ng/L     Narrative:      High Sensitive Troponin T Reference Range:  <14.0 ng/L- Negative Female for AMI  <22.0 ng/L- Negative Male for AMI  >=14 - Abnormal Female indicating possible myocardial injury.  >=22 - Abnormal Male indicating possible myocardial  injury.   Clinicians would have to utilize clinical acumen, EKG, Troponin, and serial changes to determine if it is an Acute Myocardial Infarction or myocardial injury due to an underlying chronic condition.         TSH Rfx On Abnormal To Free T4 [[id number]]  (Normal) Collected: 01/12/25 1532    Specimen: Blood from Arm, Right Updated: 01/12/25 1610     TSH 2.140 uIU/mL     Comprehensive Metabolic Panel [[id number]]  (Abnormal) Collected: 01/12/25 1532    Specimen: Blood from Arm, Right Updated: 01/12/25 1610     Glucose 199 mg/dL      BUN 11 mg/dL      Creatinine 0.75 mg/dL      Sodium 136 mmol/L      Potassium 4.0 mmol/L      Chloride 100 mmol/L      CO2 25.8 mmol/L      Calcium 8.8 mg/dL      Total Protein 7.3 g/dL      Albumin 3.8 g/dL      ALT (SGPT) 31 U/L      AST (SGOT) 31 U/L      Alkaline Phosphatase 95 U/L      Total Bilirubin 0.5 mg/dL      Globulin 3.5 gm/dL      A/G Ratio 1.1 g/dL      BUN/Creatinine Ratio 14.7     Anion Gap 10.2 mmol/L      eGFR 94.7 mL/min/1.73     Narrative:      GFR Categories in Chronic Kidney Disease (CKD)      GFR Category          GFR (mL/min/1.73)    Interpretation  G1                     90 or greater         Normal or high (1)  G2                      60-89                Mild decrease (1)  G3a                   45-59                Mild to moderate decrease  G3b                   30-44                Moderate to severe decrease  G4                    15-29                Severe decrease  G5                    14 or less           Kidney failure          (1)In the absence of evidence of kidney disease, neither GFR category G1 or G2 fulfill the criteria for CKD.    eGFR calculation 2021 CKD-EPI creatinine equation, which does not include race as a factor    High Sensitivity Troponin T [[id number]]  (Normal) Collected: 01/12/25 1532    Specimen: Blood from Arm, Right Updated: 01/12/25 1610     HS Troponin T 10 ng/L     Narrative:      High Sensitive Troponin T Reference  Range:  <14.0 ng/L- Negative Female for AMI  <22.0 ng/L- Negative Male for AMI  >=14 - Abnormal Female indicating possible myocardial injury.  >=22 - Abnormal Male indicating possible myocardial injury.   Clinicians would have to utilize clinical acumen, EKG, Troponin, and serial changes to determine if it is an Acute Myocardial Infarction or myocardial injury due to an underlying chronic condition.         Magnesium [[id number]]  (Abnormal) Collected: 01/12/25 1532    Specimen: Blood from Arm, Right Updated: 01/12/25 1610     Magnesium 1.3 mg/dL     CBC & Differential [[id number]]  (Normal) Collected: 01/12/25 1443    Specimen: Blood Updated: 01/12/25 1459    Narrative:      The following orders were created for panel order CBC & Differential.  Procedure                               Abnormality         Status                     ---------                               -----------         ------                     CBC Auto Differential[[id number]]        Normal              Final result                 Please view results for these tests on the individual orders.    CBC Auto Differential [[id number]]  (Normal) Collected: 01/12/25 1443    Specimen: Blood Updated: 01/12/25 1459     WBC 6.24 10*3/mm3      RBC 5.04 10*6/mm3      Hemoglobin 14.8 g/dL      Hematocrit 42.2 %      MCV 83.7 fL      MCH 29.4 pg      MCHC 35.1 g/dL      RDW 12.9 %      RDW-SD 39.2 fl      MPV 11.0 fL      Platelets 172 10*3/mm3      Neutrophil % 58.1 %      Lymphocyte % 30.0 %      Monocyte % 7.5 %      Eosinophil % 3.5 %      Basophil % 0.6 %      Immature Grans % 0.3 %      Neutrophils, Absolute 3.62 10*3/mm3      Lymphocytes, Absolute 1.87 10*3/mm3      Monocytes, Absolute 0.47 10*3/mm3      Eosinophils, Absolute 0.22 10*3/mm3      Basophils, Absolute 0.04 10*3/mm3      Immature Grans, Absolute 0.02 10*3/mm3      nRBC 0.0 /100 WBC             Imaging Results (Most Recent)       Procedure Component Value Units Date/Time    XR Chest 1 View  [[id number]] Collected: 01/12/25 1516     Updated: 01/12/25 1519    Narrative:      XR CHEST 1 VW    Date of Exam: 1/12/2025 3:03 PM EST    Indication: Chest Pain Protocol  Chest Pain Protocol    Comparison: None available.    Findings:  Unremarkable cardiomediastinal silhouette. No focal airspace consolidation. No pleural effusion or pneumothorax. No acute osseous abnormality.      Impression:      Impression:  No acute cardiopulmonary abnormality.        Electronically Signed: Yimi Lindo MD    1/12/2025 3:16 PM EST    Workstation ID: [id number]          reviewed    ECG/EMG Results (most recent)       Procedure Component Value Units Date/Time    ECG 12 Lead Chest Pain [[id number]] Collected: 01/12/25 1402     Updated: 01/13/25 0715     QT Interval 377 ms      QTC Interval 444 ms     Narrative:      HEART RATE=84  bpm  RR Tqdpddbz=108  ms  CO Tuqfuwyb=728  ms  P Horizontal Axis=-45  deg  P Front Axis=28  deg  QRSD Interval=91  ms  QT Advddoxk=308  ms  QUfK=626  ms  QRS Axis=15  deg  T Wave Axis=-7  deg  - ABNORMAL ECG -  Sinus rhythm  Anterior  infarct, old  No previous ECG available for comparison  Electronically Signed By: Juarez Lam (CLEMENTINA) 2025-01-13 07:14:24  Date and Time of Study:2025-01-12 14:02:19    Telemetry Scan [[id number]] Resulted: 01/12/25     Updated: 01/13/25 1244    Telemetry Scan [[id number]] Resulted: 01/12/25     Updated: 01/13/25 1524    Adult Transthoracic Echo Complete W/ Cont if Necessary Per Protocol [[id number]] Resulted: 01/13/25 1632     Updated: 01/13/25 1632     LVIDd 4.2 cm      LVIDs 2.9 cm      IVSd 1.20 cm      LVPWd 1.20 cm      FS 31.0 %      IVS/LVPW 1.00 cm      ESV(cubed) 24.4 ml      LV Sys Vol (BSA corrected) 18.7 cm2      EDV(cubed) 74.1 ml      LV Berumen Vol (BSA corrected) 57.7 cm2      LV mass(C)d 178.2 grams      LVOT area 3.1 cm2      LVOT diam 2.00 cm      EDV(MOD-sp4) 125.0 ml      ESV(MOD-sp4) 40.5 ml      SV(MOD-sp4) 84.5 ml      SVi(MOD-SP4) 39.0 ml/m2      SVi (LVOT)  36.4 ml/m2      EF(MOD-sp4) 67.6 %      MV E max ubaldo 79.1 cm/sec      MV A max ubaldo 106.0 cm/sec      MV dec time 0.22 sec      MV E/A 0.75     LA ESV Index (BP) 30.3 ml/m2      Med Peak E' Ubaldo 5.4 cm/sec      Lat Peak E' Ubaldo 8.1 cm/sec      TR max ubaldo 140.0 cm/sec      Avg E/e' ratio 11.72     SV(LVOT) 78.9 ml      RVIDd 3.0 cm      TAPSE (>1.6) 2.14 cm      RV S' 12.3 cm/sec      LA dimension (2D)  3.6 cm      LV V1 max 125.0 cm/sec      LV V1 max PG 6.3 mmHg      LV V1 mean PG 3.0 mmHg      LV V1 VTI 25.1 cm      Ao pk ubaldo 156.0 cm/sec      Ao max PG 9.7 mmHg      Ao mean PG 6.0 mmHg      Ao V2 VTI 30.3 cm      LIVIER(I,D) 2.6 cm2      MV max PG 5.6 mmHg      MV mean PG 3.0 mmHg      MV V2 VTI 27.7 cm      MV P1/2t 72.4 msec      MVA(P1/2t) 3.0 cm2      MVA(VTI) 2.8 cm2      MV dec slope 395.0 cm/sec2      TR max PG 7.8 mmHg      RVSP(TR) 10.8 mmHg      RAP systole 3.0 mmHg      RV V1 max PG 2.45 mmHg      RV V1 max 78.2 cm/sec      RV V1 VTI 16.7 cm      PA V2 max 107.0 cm/sec      PA acc time 0.12 sec      Sinus 2.9 cm      STJ 2.10 cm      EF(MOD-bp) 67.0 %     Narrative:        Left ventricular systolic function is normal. Left ventricular ejection   fraction appears to be 61 - 65%.    Left ventricular wall thickness is consistent with mild concentric   hypertrophy.    Left ventricular diastolic function was normal.    Estimated right ventricular systolic pressure from tricuspid   regurgitation is normal (<35 mmHg).            reviewed        Results for orders placed during the hospital encounter of 01/12/25    Adult Transthoracic Echo Complete W/ Cont if Necessary Per Protocol    Interpretation Summary    Left ventricular systolic function is normal. Left ventricular ejection fraction appears to be 61 - 65%.    Left ventricular wall thickness is consistent with mild concentric hypertrophy.    Left ventricular diastolic function was normal.    Estimated right ventricular systolic pressure from tricuspid  regurgitation is normal (<35 mmHg).      Microbiology Results (last 10 days)       ** No results found for the last 240 hours. **            Assessment & Plan     Chest pain    Dizziness       Chest pain  Lab Results   Component Value Date    TROPONINT 10 01/12/2025    TROPONINT 10 01/12/2025   -TSH: 2.140  -Lipid panel showed total cholesterol 131 with an LDL of 73 and HDL of 45  -Chest X-ray: No acute process  -EKG: Sinus rhythm at 84 without obvious acute changes with some baseline artifact and a QTc of 444 ms  -In the ED pt given 324 mg aspirin, meclizine and Zofran  -Check orthostatic vital signs  -Cardiology consulted in ED who ordered further testing and started metoprolol  -Telemetry  -NPO  -PT consulted with no recurrence of symptoms and no recommendation for further skilled therapy  -Stress test showed normal Myocard perfusion imaging without evidence of ischemia with an EF of 70%  -Echocardiogram    Diabetes mellitus  -Poorly controlled   Lab Results   Component Value Date    GLUCOSE 195 (H) 01/13/2025    GLUCOSE 199 (H) 01/12/2025   -Hold metformin  -Correctional insulin  -Diabetic diet  -Monitor before meals and at bedtime      I discussed the patients findings and my recommendations with patient and nursing staff.     Discharge Diagnosis:      Chest pain    Dizziness      Hospital Course  Patient is a 54 y.o. female presented with chest pain and lightheadedness with an HPI noted above.  Serial troponins were assessed remained stable at 10 with a TSH of 2.140 and lipid panel showed total cholesterol of 131 with an LDL of 73 and HDL 45.  Chest x-ray showed no acute process and EKG showed sinus rhythm at 84 without obvious acute changes.  She was continued on telemetry without significant events reported.  Patient was given 324 mg aspirin as well as meclizine and Zofran in the ED.  Cardiology was consulted in the ED who started low-dose beta-blocker and ordered stress test with normal Myocard perfusion  imaging without evidence of ischemia.  Echocardiogram was also obtained.  MCOT was ordered by cardiologist at discharge and she will continue metoprolol and follow-up with cardiology on an outpatient basis.  At this time patient is felt to be in good condition for discharge with close follow-up with her PCP cardiology on an outpatient basis.  Her full testing/results and plan were discussed with patient long with concerning/alarm symptoms for which to call 911/return to the ED.  All questions were answered and she verbalizes her understanding and agreement.    Past Medical History:     Past Medical History:   Diagnosis Date    Diabetes mellitus        Past Surgical History:     Past Surgical History:   Procedure Laterality Date    BILATERAL BREAST REDUCTION Bilateral        Social History:   Social History     Socioeconomic History    Marital status:    Tobacco Use    Smoking status: Never    Smokeless tobacco: Never   Vaping Use    Vaping status: Never Used   Substance and Sexual Activity    Alcohol use: Never    Drug use: Never    Sexual activity: Defer       Procedures Performed         Consults:   Consults       Date and Time Order Name Status Description    1/12/2025  7:20 PM Inpatient Cardiology Consult Completed             Condition on Discharge:     Stable    Discharge Disposition  Home or Self Care    Discharge Medications     Discharge Medications        New Medications        Instructions Start Date   metoprolol succinate XL 25 MG 24 hr tablet  Commonly known as: TOPROL-XL   12.5 mg, Oral, Every 12 Hours Scheduled             Continue These Medications        Instructions Start Date   cetirizine 10 MG tablet  Commonly known as: zyrTEC   10 mg, Daily      ibuprofen 800 MG tablet  Commonly known as: ADVIL,MOTRIN   800 mg, Every 8 Hours PRN      metFORMIN 1000 MG tablet  Commonly known as: GLUCOPHAGE   1,000 mg, 2 Times Daily With Meals      traMADol 50 MG tablet  Commonly known as: ULTRAM   50 mg,  3 Times Daily PRN               Discharge Diet:     Activity at Discharge:     Follow-up Appointments  No future appointments.    Additional Instructions for the Follow-ups that You Need to Schedule       Discharge Follow-up with PCP   As directed       Currently Documented PCP:    Jeff Hennessy APRN    PCP Phone Number:    [phone]     Follow Up Details: 5 to 7 days        Discharge Follow-up with Specified Provider: Cardiology   As directed      To: Cardiology                Test Results Pending at Discharge  Pending Results       Procedure [Order ID] Specimen - Date/Time    Cardiac Event Monitor (ELIDA) or Mobile Cardiac Outpatient Telemetry (MCT) [[id number]] Resulted: 01/13/25 1633     Updated: 01/13/25 1634             Risk for Readmission (LACE) Score: 1 (1/13/2025  6:00 AM)      Greater than 30 minutes spent in discharge activities for this patient    Signature:Electronically signed by Jacinto Del Angel PA-C, 01/13/25, 4:46 PM EST.

## 2025-01-13 NOTE — ED PROVIDER NOTES
Subjective   History of Present Illness  Patient is a 54-year-old female with PMH of DM2 presenting to the ED for dizziness and chest pain.  Patient states she was laying in her bed earlier today and started noticing dizziness and nausea.  She was able to stand up and started feeling chest pain on the left side of her chest spreading slightly.  She rates the initial dizziness a 10 out of 10, rates it now a 3 out of 10.  She reports pain in her chest as a sharp intermittent 4 out of 10.  She denies any vomiting, fever, chills, abdominal pain, headache, history of hypertension, confusion, or changes in vision.        Review of Systems   Constitutional:  Negative for chills and fever.   Eyes:  Negative for visual disturbance.   Respiratory:  Negative for shortness of breath.    Cardiovascular:  Positive for chest pain.   Gastrointestinal:  Positive for nausea. Negative for abdominal pain and vomiting.   Neurological:  Positive for dizziness and light-headedness. Negative for headaches.   Psychiatric/Behavioral:  Negative for confusion.        No past medical history on file.    Allergies   Allergen Reactions    Sulfa Antibiotics Itching    Penicillins Rash       No past surgical history on file.    No family history on file.    Social History     Socioeconomic History    Marital status:            Objective   Physical Exam  Constitutional:       Appearance: She is well-developed.   HENT:      Head: Normocephalic and atraumatic.   Eyes:      Extraocular Movements: Extraocular movements intact.      Pupils: Pupils are equal, round, and reactive to light.   Cardiovascular:      Rate and Rhythm: Normal rate and regular rhythm.      Heart sounds: Normal heart sounds.   Pulmonary:      Effort: Pulmonary effort is normal.      Breath sounds: Normal breath sounds.   Abdominal:      General: Bowel sounds are normal.      Palpations: Abdomen is soft.      Tenderness: There is no abdominal tenderness.   Musculoskeletal:    "      General: Normal range of motion.      Cervical back: Normal range of motion.      Right lower leg: No tenderness. No edema.      Left lower leg: No tenderness. No edema.   Skin:     General: Skin is warm and dry.      Capillary Refill: Capillary refill takes less than 2 seconds.   Neurological:      General: No focal deficit present.      Mental Status: She is alert and oriented to person, place, and time.   Psychiatric:         Mood and Affect: Mood normal.         Behavior: Behavior normal.         Procedures           ED Course      /78   Pulse 83   Temp 98.6 °F (37 °C)   Resp 16   Ht 170.2 cm (67\")   Wt 109 kg (241 lb 2.9 oz)   SpO2 96%   BMI 37.77 kg/m²   Labs Reviewed   COMPREHENSIVE METABOLIC PANEL - Abnormal; Notable for the following components:       Result Value    Glucose 199 (*)     All other components within normal limits    Narrative:     GFR Categories in Chronic Kidney Disease (CKD)      GFR Category          GFR (mL/min/1.73)    Interpretation  G1                     90 or greater         Normal or high (1)  G2                      60-89                Mild decrease (1)  G3a                   45-59                Mild to moderate decrease  G3b                   30-44                Moderate to severe decrease  G4                    15-29                Severe decrease  G5                    14 or less           Kidney failure          (1)In the absence of evidence of kidney disease, neither GFR category G1 or G2 fulfill the criteria for CKD.    eGFR calculation 2021 CKD-EPI creatinine equation, which does not include race as a factor   MAGNESIUM - Abnormal; Notable for the following components:    Magnesium 1.3 (*)     All other components within normal limits   TROPONIN - Normal    Narrative:     High Sensitive Troponin T Reference Range:  <14.0 ng/L- Negative Female for AMI  <22.0 ng/L- Negative Male for AMI  >=14 - Abnormal Female indicating possible myocardial injury.  >=22 " - Abnormal Male indicating possible myocardial injury.   Clinicians would have to utilize clinical acumen, EKG, Troponin, and serial changes to determine if it is an Acute Myocardial Infarction or myocardial injury due to an underlying chronic condition.        CBC WITH AUTO DIFFERENTIAL - Normal   TSH RFX ON ABNORMAL TO FREE T4 - Normal   HIGH SENSITIVITIY TROPONIN T 1HR - Normal    Narrative:     High Sensitive Troponin T Reference Range:  <14.0 ng/L- Negative Female for AMI  <22.0 ng/L- Negative Male for AMI  >=14 - Abnormal Female indicating possible myocardial injury.  >=22 - Abnormal Male indicating possible myocardial injury.   Clinicians would have to utilize clinical acumen, EKG, Troponin, and serial changes to determine if it is an Acute Myocardial Infarction or myocardial injury due to an underlying chronic condition.        CBC AND DIFFERENTIAL    Narrative:     The following orders were created for panel order CBC & Differential.  Procedure                               Abnormality         Status                     ---------                               -----------         ------                     CBC Auto Differential[[id number]]        Normal              Final result                 Please view results for these tests on the individual orders.     Medications   sodium chloride 0.9 % flush 10 mL (has no administration in time range)   Magnesium Standard Dose Replacement - Follow Nurse / BPA Driven Protocol (has no administration in time range)   magnesium sulfate 2g/50 mL (PREMIX) infusion (2 g Intravenous New Bag 1/12/25 1949)   sodium chloride 0.9 % flush 10 mL (has no administration in time range)   sodium chloride 0.9 % flush 10 mL (has no administration in time range)   sodium chloride 0.9 % infusion 40 mL (has no administration in time range)   nitroglycerin (NITROSTAT) SL tablet 0.4 mg (has no administration in time range)   Enoxaparin Sodium (LOVENOX) syringe 40 mg (has no administration in  time range)   acetaminophen (TYLENOL) tablet 650 mg (has no administration in time range)     Or   acetaminophen (TYLENOL) 160 MG/5ML oral solution 650 mg (has no administration in time range)     Or   acetaminophen (TYLENOL) suppository 650 mg (has no administration in time range)   sennosides-docusate (PERICOLACE) 8.6-50 MG per tablet 2 tablet (has no administration in time range)     And   polyethylene glycol (MIRALAX) packet 17 g (has no administration in time range)     And   bisacodyl (DULCOLAX) EC tablet 5 mg (has no administration in time range)     And   bisacodyl (DULCOLAX) suppository 10 mg (has no administration in time range)   ondansetron ODT (ZOFRAN-ODT) disintegrating tablet 4 mg (has no administration in time range)     Or   ondansetron (ZOFRAN) injection 4 mg (has no administration in time range)   meclizine (ANTIVERT) tablet 25 mg (has no administration in time range)   aspirin chewable tablet 324 mg (324 mg Oral Given 1/12/25 1447)   ondansetron ODT (ZOFRAN-ODT) disintegrating tablet 4 mg (4 mg Oral Given 1/12/25 1447)   ondansetron ODT (ZOFRAN-ODT) disintegrating tablet 4 mg (4 mg Oral Given 1/12/25 1835)   meclizine (ANTIVERT) tablet 25 mg (25 mg Oral Given 1/12/25 1835)     XR Chest 1 View    Result Date: 1/12/2025  Impression: No acute cardiopulmonary abnormality. Electronically Signed: Yimi Lindo MD  1/12/2025 3:16 PM EST  Workstation ID: [id number]               HEART Score: 2                                      Medical Decision Making  Patient presented to the ED for the above complaint.    Patient underwent the above exam and evaluation.    While in the ED patient was placed in gown and an IV was established.  EKG, chest x-ray, blood work was obtained to assess for arrhythmia, MI, electrolyte abnormality, dehydration. Patient was given 4 aspirin, and Zofran.  Upon reevaluation patient is resting comfortably, denying any current chest pain, not tachycardic, O2 sats stable, blood pressure  stable.  She was noted to have hypomagnesemia, was given electrolyte replacement.  Offered to place patient in observation as she has never had cardiac evaluation.  After discussion with patient that she had a low heart score of 2 she decided she would go home and follow-up outpatient as she felt better and she was not having any active chest pain.  While patient was getting ready to discharge she had another dizzy spell and slight chest pain prompting her to want to stay in observation for further evaluation.  Patient was given meclizine and more Zofran with slight improvement in symptoms.  Educated patient that we will place her in observation for cardiology to see him tomorrow as well as symptomatic treatment overnight.  Patient voiced understanding, agreeable with dispo plan.    EKG, interpreted by Dr. Lam showing sinus rhythm, old anterior infarct possible, rate 84, ID interval 159, QTc 444, no previous EKG for comparison.  Labs were independently interpreted by myself and deemed remarkable for the following: WBC 6.24, glucose 199, initial troponin 10, repeat troponin 10, mag 1.3, TSH 2.14.  Chest x-ray independently interpreted by radiologist and reviewed by myself showing: No acute findings.    Appropriate PPE was worn during each patient encounter.    Discussed this patient with Dr. Zayas who agrees with plan.      Problems Addressed:  Chest pain, unspecified type: complicated acute illness or injury  Dizziness: complicated acute illness or injury  Hypomagnesemia: complicated acute illness or injury  Nausea: complicated acute illness or injury    Amount and/or Complexity of Data Reviewed  Labs: ordered.  Radiology: ordered.    Risk  OTC drugs.  Prescription drug management.  Decision regarding hospitalization.        Final diagnoses:   Chest pain, unspecified type   Dizziness   Nausea   Hypomagnesemia       ED Disposition  ED Disposition       ED Disposition   Decision to Admit    Condition   --     Comment   Level of Care: Observation Unit [28]   Diagnosis: Dizziness [142119]   Admitting Physician: TATE VALENZUELA [[id number]]   Attending Physician: TATE VALENZUELA [[id number]]                 Jeff Hennessy, APRN  [address] [facility] [place] [id number] [phone]    Schedule an appointment as soon as possible for a visit       Robles Hylton MD  [address] [facility] [place] [address] [phone]    Schedule an appointment as soon as possible for a visit            Medication List      No changes were made to your prescriptions during this visit.            Krystle Marshall PA-C  01/12/25 2003

## 2025-01-13 NOTE — PLAN OF CARE
Problem: Adult Inpatient Plan of Care  Goal: Plan of Care Review  1/13/2025 1623 by Naila Peterson RN  Outcome: Met  1/13/2025 1454 by Naila Peterson RN  Outcome: Progressing  Goal: Patient-Specific Goal (Individualized)  1/13/2025 1623 by Naila Peterson RN  Outcome: Met  1/13/2025 1454 by Naila Petreson RN  Outcome: Progressing  Goal: Absence of Hospital-Acquired Illness or Injury  1/13/2025 1623 by Naila Peterson RN  Outcome: Met  1/13/2025 1454 by Naila Peterson RN  Outcome: Progressing  Intervention: Identify and Manage Fall Risk  Recent Flowsheet Documentation  Taken 1/13/2025 1400 by Naila Peterson RN  Safety Promotion/Fall Prevention:   nonskid shoes/slippers when out of bed   toileting scheduled   safety round/check completed   clutter free environment maintained   assistive device/personal items within reach   gait belt  Taken 1/13/2025 1200 by Naila Peterson RN  Safety Promotion/Fall Prevention:   nonskid shoes/slippers when out of bed   toileting scheduled   safety round/check completed   clutter free environment maintained   assistive device/personal items within reach   gait belt  Taken 1/13/2025 1000 by Naila Peterson RN  Safety Promotion/Fall Prevention:   nonskid shoes/slippers when out of bed   toileting scheduled   safety round/check completed   clutter free environment maintained   assistive device/personal items within reach   gait belt  Taken 1/13/2025 0800 by Naila Peterson RN  Safety Promotion/Fall Prevention:   nonskid shoes/slippers when out of bed   toileting scheduled   safety round/check completed   clutter free environment maintained   assistive device/personal items within reach   gait belt  Intervention: Prevent Infection  Recent Flowsheet Documentation  Taken 1/13/2025 1400 by Naila Peterson RN  Infection Prevention: environmental surveillance performed  Taken 1/13/2025 1200 by Naila Peterson RN  Infection Prevention:  environmental surveillance performed  Taken 1/13/2025 1000 by Naila Peterson RN  Infection Prevention: environmental surveillance performed  Taken 1/13/2025 0800 by Naila Peterson RN  Infection Prevention: environmental surveillance performed  Goal: Optimal Comfort and Wellbeing  1/13/2025 1623 by Naila Peterson RN  Outcome: Met  1/13/2025 1454 by Naila Petreson RN  Outcome: Progressing  Goal: Readiness for Transition of Care  1/13/2025 1623 by Naila Peterson RN  Outcome: Met  1/13/2025 1454 by Naila Peterson RN  Outcome: Progressing     Problem: Chest Pain  Goal: Resolution of Chest Pain Symptoms  1/13/2025 1623 by Naila Peterson RN  Outcome: Met  1/13/2025 1454 by Naila Peterson RN  Outcome: Progressing   Goal Outcome Evaluation:

## 2025-01-13 NOTE — PLAN OF CARE
Problem: Adult Inpatient Plan of Care  Goal: Plan of Care Review  Outcome: Not Progressing  Flowsheets (Taken 1/13/2025 0236)  Progress: no change  Outcome Evaluation: Patient NPO to stress test in AM.  Plan of Care Reviewed With: patient  Goal: Patient-Specific Goal (Individualized)  Outcome: Not Progressing  Goal: Absence of Hospital-Acquired Illness or Injury  Outcome: Not Progressing  Intervention: Identify and Manage Fall Risk  Description: Perform standard risk assessment on admission using a validated tool or comprehensive approach appropriate to the patient; reassess fall risk frequently, with change in status or transfer to another level of care.  Communicate risk to interprofessional healthcare team; ensure fall risk visible cue.  Determine need for increased observation, equipment and environmental modification, as well as use of supportive, nonskid footwear.  Adjust safety measures to individual needs and identified risk factors.  Reinforce the importance of active participation with fall risk prevention, safety, and physical activity with the patient and family.  Perform regular intentional rounding to assess need for position change, pain assessment and personal needs, including assistance with toileting.  Recent Flowsheet Documentation  Taken 1/13/2025 0200 by Yany Ennis, RN  Safety Promotion/Fall Prevention: safety round/check completed  Taken 1/13/2025 0000 by Yany Ennis, RN  Safety Promotion/Fall Prevention: safety round/check completed  Taken 1/12/2025 2200 by Yany Ennis, RN  Safety Promotion/Fall Prevention: safety round/check completed  Intervention: Prevent Skin Injury  Description: Perform a screening for skin injury risk, such as pressure or moisture-associated skin damage on admission and at regular intervals throughout hospital stay.  Keep all areas of skin (especially folds) clean and dry.  Maintain adequate skin hydration.  Relieve and redistribute pressure and  protect bony prominences and skin at risk for injury; implement measures based on patient-specific risk factors.  Match turning and repositioning schedule to clinical condition.  Encourage weight shift frequently; assist with reposition if unable to complete independently.  Float heels off bed; avoid pressure on the Achilles tendon.  Keep skin free from extended contact with medical devices.  Optimize nutrition and hydration.  Encourage functional activity and mobility, as early as tolerated.  Use aids (e.g., slide boards, mechanical lift) during transfer.  Recent Flowsheet Documentation  Taken 1/12/2025 2200 by Yany Ennis RN  Body Position: position changed independently  Intervention: Prevent and Manage VTE (Venous Thromboembolism) Risk  Description: Assess for VTE (venous thromboembolism) risk.  Promote early mobilization; encourage both active and passive leg exercises, if unable to ambulate.  Initiate and maintain compression or other therapy, as indicated, based on identified risk in accordance with organizational protocol and provider order.  Recognize the patient's individual risk for bleeding before initiating pharmacologic thromboprophylaxis.  Recent Flowsheet Documentation  Taken 1/12/2025 2200 by Yany Ennis RN  VTE Prevention/Management:   bilateral   SCDs (sequential compression devices) off  Intervention: Prevent Infection  Description: Maintain skin and mucous membrane integrity; promote hand, oral and pulmonary hygiene.  Optimize fluid balance, nutrition, sleep and glycemic control to maximize infection resistance.  Identify potential sources of infection early to prevent or mitigate progression of infection (e.g., wound, lines, devices).  Evaluate ongoing need for invasive devices; remove promptly when no longer indicated.  Review vaccination status.  Recent Flowsheet Documentation  Taken 1/13/2025 0200 by Yany Ennis RN  Infection Prevention:   hand hygiene promoted    rest/sleep promoted   single patient room provided  Taken 1/13/2025 0000 by Yany Ennis RN  Infection Prevention:   hand hygiene promoted   rest/sleep promoted   single patient room provided  Taken 1/12/2025 2200 by Yany Ennis RN  Infection Prevention:   hand hygiene promoted   rest/sleep promoted   single patient room provided  Goal: Optimal Comfort and Wellbeing  Outcome: Not Progressing  Intervention: Provide Person-Centered Care  Description: Use a family-focused approach to care; encourage support system presence and participation.  Develop trust and rapport by proactively providing information, encouraging questions, addressing concerns and offering reassurance.  Acknowledge emotional response to hospitalization.  Recognize and utilize personal coping strategies and strengths; develop goals via shared decision-making.  Honor spiritual and cultural preferences.  Recent Flowsheet Documentation  Taken 1/12/2025 2200 by Yany Ennis RN  Trust Relationship/Rapport:   care explained   choices provided   emotional support provided   empathic listening provided   questions answered   questions encouraged   reassurance provided   thoughts/feelings acknowledged  Goal: Readiness for Transition of Care  Outcome: Not Progressing  Intervention: Mutually Develop Transition Plan  Description: Identify available resources for support (e.g., family, friends, community).  Identify and address barriers to ongoing treatment and home management (e.g., environmental, financial).  Provide opportunities to practice self-management skills.  Assess and monitor emotional readiness for transition.  Establish or reconnect linkage with outpatient providers or community-based services.  Recent Flowsheet Documentation  Taken 1/12/2025 2227 by Yany Ennis RN  Transportation Anticipated: family or friend will provide  Patient/Family Anticipated Services at Transition: none  Patient/Family Anticipates Transition to:  home with family  Taken 1/12/2025 2143 by Yany Ennis, RN  Transportation Anticipated: family or friend will provide  Patient/Family Anticipated Services at Transition: none  Patient/Family Anticipates Transition to: home with family  Taken 1/12/2025 2139 by Yany Ennis, RN  Equipment Currently Used at Home: none     Problem: Chest Pain  Goal: Resolution of Chest Pain Symptoms  Outcome: Not Progressing   Goal Outcome Evaluation:  Plan of Care Reviewed With: patient        Progress: no change  Outcome Evaluation: Patient NPO to stress test in AM.

## 2025-01-13 NOTE — CONSULTS
[facility] [place]        Subjective:     Encounter Date:01/12/2025      Patient ID: Dino Melgoza is a 54 y.o. female.    Chief Complaint: dizziness, pre syncope, chest pain    Referring Physician: Dr. Zayas    HPI:  Dino Melgoza is a 54 y.o. female who presents with dizziness, nausea, pre syncope and left sided chest discomfort. Ms. Melgoza does not routinely see a cardiologist. Pmh includes diabetes, obesity. She reports a remote LHC years ago which reportedly did not show obstructive CAD (no details).    She presents to ER with episode of dizziness while laying down, nausea, pre syncope and left sided sharp chest discomfort. She was in ER and thought they were letting her go when she had another episode of dizziness , nausea, and chest discomfort while walking to the bathroom. She also notes palpitations at times. She reports no recent illness. She works as a pharmacist at [employer]. Job is stressful and notes more palpitations while working. B/p on higher side during hospitalization.     Work up reveals normal troponin. TSH WNL. No acute changes on ECG. Mg low at 1.3 on admission, this has been replaced      Past Medical History:   Diagnosis Date    Diabetes mellitus        Past Surgical History:   Procedure Laterality Date    BILATERAL BREAST REDUCTION Bilateral        History reviewed. No pertinent family history.    Social History     Socioeconomic History    Marital status:    Tobacco Use    Smoking status: Never    Smokeless tobacco: Never   Vaping Use    Vaping status: Never Used   Substance and Sexual Activity    Alcohol use: Never    Drug use: Never    Sexual activity: Defer         Allergies   Allergen Reactions    Sulfa Antibiotics Itching    Penicillins Rash       Current Medications:   Scheduled Meds:aspirin, 81 mg, Oral, Daily  cetirizine, 10 mg, Oral, Daily  enoxaparin, 40 mg, Subcutaneous, Daily  insulin lispro, 2-9 Units, Subcutaneous, 4x Daily AC & at  "Bedtime  metoprolol succinate XL, 12.5 mg, Oral, Q12H  sodium chloride, 10 mL, Intravenous, Q12H      Continuous Infusions:     Review of Systems   Constitutional: Negative for chills and fever.   HENT:  Negative for ear discharge and nosebleeds.    Eyes:  Negative for discharge and redness.   Cardiovascular:  Positive for chest pain. Negative for orthopnea, palpitations, paroxysmal nocturnal dyspnea and syncope.   Respiratory:  Negative for cough, shortness of breath and wheezing.    Endocrine: Negative for heat intolerance.   Skin:  Negative for rash.   Musculoskeletal:  Negative for arthritis and myalgias.   Gastrointestinal:  Negative for abdominal pain, melena, nausea and vomiting.   Genitourinary:  Negative for dysuria and hematuria.   Neurological:  Positive for dizziness and vertigo. Negative for light-headedness, numbness and tremors.   Psychiatric/Behavioral:  Negative for depression. The patient is not nervous/anxious.             Objective:         /79 (BP Location: Right arm, Patient Position: Lying)   Pulse 80   Temp 98.4 °F (36.9 °C) (Oral)   Resp 15   Ht 170.2 cm (67\")   Wt 107 kg (235 lb)   SpO2 96%   BMI 36.81 kg/m²     Physical Exam:  General Appearance:    Alert, cooperative, in no acute distress                                Head: Atraumatic, normocephalic, PERRLA               Neck:   supple, trachea midline, no thyromegaly, no carotid bruit, no JVD   Lungs:     Clear to auscultation,respirations regular, even and               unlabored    Heart:    Regular rhythm and normal rate, normal S1 and S2   Abdomen:     Normal bowel sounds, no masses, no organomegaly, soft  nontender, nondistended, no guarding, no rebound  tenderness   Extremities:   Moves all extremities well, no edema, no cyanosis, no  redness   Pulses:   Pulses palpable and equal bilaterally   Skin:   No bleeding, bruising or rash   Neurologic:   Awake, alert, oriented x3                 ASCVD Risk Score::  The " 10-year ASCVD risk score (Tyrese PRICE, et al., 2019) is: 1.9%    Values used to calculate the score:      Age: 54 years      Sex: Female      Is Non- : No      Diabetic: No      Tobacco smoker: No      Systolic Blood Pressure: 152 mmHg      Is BP treated: No      HDL Cholesterol: 45 mg/dL      Total Cholesterol: 131 mg/dL      Lab Review:     Results from last 7 days   Lab Units 01/13/25  0102 01/12/25  1532   SODIUM mmol/L 137 136   POTASSIUM mmol/L 3.8 4.0   CHLORIDE mmol/L 101 100   CO2 mmol/L 24.8 25.8   BUN mg/dL 9 11   CREATININE mg/dL 0.65 0.75   GLUCOSE mg/dL 195* 199*   CALCIUM mg/dL 8.8 8.8   AST (SGOT) U/L  --  31   ALT (SGPT) U/L  --  31     Results from last 7 days   Lab Units 01/12/25  1628 01/12/25  1532   HSTROP T ng/L 10 10     Results from last 7 days   Lab Units 01/13/25  0102 01/12/25  1443   WBC 10*3/mm3 8.31 6.24   HEMOGLOBIN g/dL 13.1 14.8   HEMATOCRIT % 37.8 42.2   PLATELETS 10*3/mm3 169 172         Results from last 7 days   Lab Units 01/13/25  0102 01/12/25  1532   MAGNESIUM mg/dL 3.0* 1.3*     Results from last 7 days   Lab Units 01/13/25  0102   CHOLESTEROL mg/dL 131   TRIGLYCERIDES mg/dL 59   HDL CHOL mg/dL 45         Results from last 7 days   Lab Units 01/12/25  1532   TSH uIU/mL 2.140       Recent Radiology:  Imaging Results (Most Recent)       Procedure Component Value Units Date/Time    XR Chest 1 View [[id number]] Collected: 01/12/25 1516     Updated: 01/12/25 1519    Narrative:      XR CHEST 1 VW    Date of Exam: 1/12/2025 3:03 PM EST    Indication: Chest Pain Protocol  Chest Pain Protocol    Comparison: None available.    Findings:  Unremarkable cardiomediastinal silhouette. No focal airspace consolidation. No pleural effusion or pneumothorax. No acute osseous abnormality.      Impression:      Impression:  No acute cardiopulmonary abnormality.        Electronically Signed: Yimi Lindo MD    1/12/2025 3:16 PM EST    Workstation ID: [id number]               ECHOCARDIOGRAM:    Results for orders placed during the hospital encounter of 01/12/25    Adult Transthoracic Echo Complete W/ Cont if Necessary Per Protocol    Interpretation Summary    Left ventricular systolic function is normal. Left ventricular ejection fraction appears to be 61 - 65%.    Left ventricular wall thickness is consistent with mild concentric hypertrophy.    Left ventricular diastolic function was normal.    Estimated right ventricular systolic pressure from tricuspid regurgitation is normal (<35 mmHg).                  Assessment:         Active Hospital Problems    Diagnosis  POA    **Chest pain [R07.9]  Yes    Dizziness [R42]  Yes     Chest Pain  CE negative  No acute ECG changes    Dizziness / lightheaded / pre syncope  Check orthostatics    Palpitations  Start low dose beta blockers  Low mag on admission  [facility] at d/c    DM    HTN  Start low dose BB with sx of palpitations     Plan:   Check orthostatics  Start low dose beta blockers   Will check 2D ECHO  Lexiscan stress test  Monitor at d/c      Patient is seen and examined and findings are verified.  All data is reviewed by me personally.  Assessment and plan formulated by APC was done after discussion with attending.  I spent more than 50% of time in taking care of the patient.    Patient is presented with symptom of chest pain.  Patient was laying in the bed and had an episode of dizziness and lightheadedness and spinning of the head in the room.  This is consistent with vertigo.    Hemodynamics are stable    Normal S1 and S2.  No pericardial rub or murmur abdominal exam is benign    Patient presentation is consistent with benign positional vertigo.  I would recommend that patient be treated symptomatically with meclizine.  Patient underwent stress test which was negative for ischemia.  No myocardial infarction noted.  Echocardiogram has been unremarkable.    Patient can be discharged.  I would recommend [facility] on  discharge.    Electronically signed by Librado Castañeda MD, 01/13/25, 5:10 PM EST.                 Librado Castañeda MD  01/13/25  17:10 EST

## 2025-01-13 NOTE — PLAN OF CARE
Problem: Adult Inpatient Plan of Care  Goal: Plan of Care Review  Outcome: Progressing  Goal: Patient-Specific Goal (Individualized)  Outcome: Progressing  Goal: Absence of Hospital-Acquired Illness or Injury  Outcome: Progressing  Intervention: Identify and Manage Fall Risk  Recent Flowsheet Documentation  Taken 1/13/2025 1400 by Naila Peterson RN  Safety Promotion/Fall Prevention:   nonskid shoes/slippers when out of bed   toileting scheduled   safety round/check completed   clutter free environment maintained   assistive device/personal items within reach   gait belt  Taken 1/13/2025 1200 by Naila Peterson RN  Safety Promotion/Fall Prevention:   nonskid shoes/slippers when out of bed   toileting scheduled   safety round/check completed   clutter free environment maintained   assistive device/personal items within reach   gait belt  Taken 1/13/2025 1000 by Naila Peterson RN  Safety Promotion/Fall Prevention:   nonskid shoes/slippers when out of bed   toileting scheduled   safety round/check completed   clutter free environment maintained   assistive device/personal items within reach   gait belt  Taken 1/13/2025 0800 by Naila Peterson RN  Safety Promotion/Fall Prevention:   nonskid shoes/slippers when out of bed   toileting scheduled   safety round/check completed   clutter free environment maintained   assistive device/personal items within reach   gait belt  Intervention: Prevent Infection  Recent Flowsheet Documentation  Taken 1/13/2025 1400 by Naila Peterson RN  Infection Prevention: environmental surveillance performed  Taken 1/13/2025 1200 by Naila Peterson RN  Infection Prevention: environmental surveillance performed  Taken 1/13/2025 1000 by Naila Peterson RN  Infection Prevention: environmental surveillance performed  Taken 1/13/2025 0800 by Naila Peterson RN  Infection Prevention: environmental surveillance performed  Goal: Optimal Comfort and Wellbeing  Outcome:  Progressing  Goal: Readiness for Transition of Care  Outcome: Progressing     Problem: Chest Pain  Goal: Resolution of Chest Pain Symptoms  Outcome: Progressing   Goal Outcome Evaluation:

## 2025-01-13 NOTE — PLAN OF CARE
Goal Outcome Evaluation:  Plan of Care Reviewed With: patient           Outcome Evaluation: Pt is a 55 y/o female that presents with dizziness and chest pain.  Chest X-ray: (-) acute.  Pt reports she lives with her daughter (when she is not away at college).  Pt lives in a 2 story home with 5 steps to enter with railings.  Pt's bedroom/bathroom is located on main floor.   Pt was independent with all functional mobility tasks and did not use an assistive device prior to hospitalization.  Pt on room air and telemetry this date.  Pt was independent with bed mobility and transfers and ambulated 300' with independence.  Pt declined need to attempt stair navigation.  Pt with no c/o dizziness throughout session.  Pt currently has no skilled PT needs, will d/c from inpt PT services.    Anticipated Discharge Disposition (PT): home

## 2025-01-14 NOTE — CASE MANAGEMENT/SOCIAL WORK
Case Management Discharge Note      Final Note: Routine home         Selected Continued Care - Discharged on 1/13/2025 Admission date: 1/12/2025 - Discharge disposition: Home or Self Care     Transportation Services  Private: Car    Final Discharge Disposition Code: 01 - home or self-care

## 2025-01-24 LAB — CV ZIO PRESCRIPTION STATUS: NORMAL

## 2025-02-22 LAB
CV ZIO BASELINE AVG BPM: 78 BPM
CV ZIO BASELINE BPM HIGH: 122 BPM
CV ZIO BASELINE BPM LOW: 52 BPM
CV ZIO DEVICE ANALYSIS TIME: NORMAL
CV ZIO ECT SVE COUNT: 55 EPISODES
CV ZIO ECT SVE CPLT COUNT: 0 EPISODES
CV ZIO ECT SVE CPLT FREQ: 0
CV ZIO ECT SVE FREQ: NORMAL
CV ZIO ECT SVE TPLT COUNT: 4 EPISODES
CV ZIO ECT SVE TPLT FREQ: NORMAL
CV ZIO ECT VE COUNT: 131 EPISODES
CV ZIO ECT VE CPLT COUNT: 0 EPISODES
CV ZIO ECT VE CPLT FREQ: 0
CV ZIO ECT VE FREQ: NORMAL
CV ZIO ECT VE TPLT COUNT: 0 EPISODES
CV ZIO ECT VE TPLT FREQ: 0
CV ZIO ECTOPIC SVE COUPLET RAW PERCENT: 0 %
CV ZIO ECTOPIC SVE ISOLATED PERCENT: 0.03 %
CV ZIO ECTOPIC SVE TRIPLET RAW PERCENT: 0.01 %
CV ZIO ECTOPIC VE COUPLET RAW PERCENT: 0 %
CV ZIO ECTOPIC VE ISOLATED PERCENT: 0.08 %
CV ZIO ECTOPIC VE TRIPLET RAW PERCENT: 0 %
CV ZIO ENROLLMENT END: NORMAL
CV ZIO ENROLLMENT START: NORMAL
CV ZIO PATIENT EVENTS DIARIES: 0
CV ZIO PATIENT EVENTS TRIGGERS: 1
CV ZIO PAUSE COUNT: 0
CV ZIO PRESCRIPTION STATUS: NORMAL
CV ZIO SVT AVG BPM: 95 BPM
CV ZIO SVT BPM HIGH: 113 BPM
CV ZIO SVT BPM LOW: 74 BPM
CV ZIO SVT COUNT: 1
CV ZIO SVT F EPI AVG BPM: 95 BPM
CV ZIO SVT F EPI BEATS: 14 BEATS
CV ZIO SVT F EPI BPM HIGH: 113 BPM
CV ZIO SVT F EPI BPM LOW: 74 BPM
CV ZIO SVT F EPI DUR: 8.9 SEC
CV ZIO SVT F EPI END: NORMAL
CV ZIO SVT F EPI START: NORMAL
CV ZIO SVT L EPI AVG BPM: 95 BPM
CV ZIO SVT L EPI BEATS: 14 BEATS
CV ZIO SVT L EPI BPM HIGH: 113 BPM
CV ZIO SVT L EPI BPM LOW: 74 BPM
CV ZIO SVT L EPI DUR: 8.9 SEC
CV ZIO SVT L EPI END: NORMAL
CV ZIO SVT L EPI START: NORMAL
CV ZIO TOTAL  ENROLLMENT PERIOD: NORMAL
CV ZIO VT COUNT: 0